# Patient Record
Sex: FEMALE | Race: WHITE | NOT HISPANIC OR LATINO | Employment: FULL TIME | URBAN - METROPOLITAN AREA
[De-identification: names, ages, dates, MRNs, and addresses within clinical notes are randomized per-mention and may not be internally consistent; named-entity substitution may affect disease eponyms.]

---

## 2019-08-05 ENCOUNTER — RECORDS - HEALTHEAST (OUTPATIENT)
Dept: LAB | Facility: CLINIC | Age: 49
End: 2019-08-05

## 2019-08-05 LAB
ANION GAP SERPL CALCULATED.3IONS-SCNC: 9 MMOL/L (ref 5–18)
BUN SERPL-MCNC: 9 MG/DL (ref 8–22)
CALCIUM SERPL-MCNC: 9.3 MG/DL (ref 8.5–10.5)
CHLORIDE BLD-SCNC: 100 MMOL/L (ref 98–107)
CHOLEST SERPL-MCNC: 203 MG/DL
CO2 SERPL-SCNC: 27 MMOL/L (ref 22–31)
CREAT SERPL-MCNC: 0.78 MG/DL (ref 0.6–1.1)
FASTING STATUS PATIENT QL REPORTED: ABNORMAL
FERRITIN SERPL-MCNC: 12 NG/ML (ref 10–130)
GFR SERPL CREATININE-BSD FRML MDRD: >60 ML/MIN/1.73M2
GLUCOSE BLD-MCNC: 79 MG/DL (ref 70–125)
HDLC SERPL-MCNC: 76 MG/DL
IRON SATN MFR SERPL: 7 % (ref 20–50)
IRON SERPL-MCNC: 34 UG/DL (ref 42–175)
LDLC SERPL CALC-MCNC: 100 MG/DL
POTASSIUM BLD-SCNC: 4 MMOL/L (ref 3.5–5)
SODIUM SERPL-SCNC: 136 MMOL/L (ref 136–145)
TIBC SERPL-MCNC: 465 UG/DL (ref 313–563)
TRANSFERRIN SERPL-MCNC: 372 MG/DL (ref 212–360)
TRIGL SERPL-MCNC: 135 MG/DL
TSH SERPL DL<=0.005 MIU/L-ACNC: 0.77 UIU/ML (ref 0.3–5)
VIT B12 SERPL-MCNC: 208 PG/ML (ref 213–816)

## 2019-08-06 LAB
HPV SOURCE: ABNORMAL
HUMAN PAPILLOMA VIRUS 16 DNA: NEGATIVE
HUMAN PAPILLOMA VIRUS 18 DNA: NEGATIVE
HUMAN PAPILLOMA VIRUS FINAL DIAGNOSIS: ABNORMAL
HUMAN PAPILLOMA VIRUS OTHER HR: POSITIVE
SPECIMEN DESCRIPTION: ABNORMAL

## 2019-08-12 LAB
BKR LAB AP ABNORMAL BLEEDING: NO
BKR LAB AP BIRTH CONTROL/HORMONES: ABNORMAL
BKR LAB AP CERVICAL APPEARANCE: NORMAL
BKR LAB AP GYN ADEQUACY: ABNORMAL
BKR LAB AP GYN INTERPRETATION: ABNORMAL
BKR LAB AP HPV REFLEX: ABNORMAL
BKR LAB AP LMP: ABNORMAL
BKR LAB AP PATIENT STATUS: NO
BKR LAB AP PREVIOUS ABNORMAL: ABNORMAL
BKR LAB AP PREVIOUS NORMAL: ABNORMAL
HIGH RISK?: NO
PATH REPORT.COMMENTS IMP SPEC: ABNORMAL
RESULT FLAG (HE HISTORICAL CONVERSION): ABNORMAL

## 2019-12-05 ENCOUNTER — RECORDS - HEALTHEAST (OUTPATIENT)
Dept: LAB | Facility: CLINIC | Age: 49
End: 2019-12-05

## 2019-12-05 LAB
ERYTHROCYTE [DISTWIDTH] IN BLOOD BY AUTOMATED COUNT: 14.6 % (ref 11–14.5)
FERRITIN SERPL-MCNC: 14 NG/ML (ref 10–130)
HCT VFR BLD AUTO: 41.4 % (ref 35–47)
HGB BLD-MCNC: 13.3 G/DL (ref 12–16)
IRON SATN MFR SERPL: 11 % (ref 20–50)
IRON SERPL-MCNC: 50 UG/DL (ref 42–175)
MCH RBC QN AUTO: 29.9 PG (ref 27–34)
MCHC RBC AUTO-ENTMCNC: 32.1 G/DL (ref 32–36)
MCV RBC AUTO: 93 FL (ref 80–100)
PLATELET # BLD AUTO: 370 THOU/UL (ref 140–440)
PMV BLD AUTO: 9.3 FL (ref 8.5–12.5)
RBC # BLD AUTO: 4.45 MILL/UL (ref 3.8–5.4)
TIBC SERPL-MCNC: 476 UG/DL (ref 313–563)
TRANSFERRIN SERPL-MCNC: 381 MG/DL (ref 212–360)
VIT B12 SERPL-MCNC: 264 PG/ML (ref 213–816)
WBC: 8.8 THOU/UL (ref 4–11)

## 2020-04-08 ENCOUNTER — ALLIED HEALTH/NURSE VISIT (OUTPATIENT)
Dept: PHARMACY | Facility: PHYSICIAN GROUP | Age: 50
End: 2020-04-08
Payer: COMMERCIAL

## 2020-04-08 DIAGNOSIS — F32.9 MAJOR DEPRESSIVE DISORDER, REMISSION STATUS UNSPECIFIED, UNSPECIFIED WHETHER RECURRENT: ICD-10-CM

## 2020-04-08 DIAGNOSIS — F41.9 ANXIETY: Primary | ICD-10-CM

## 2020-04-08 DIAGNOSIS — G47.00 INSOMNIA, UNSPECIFIED TYPE: ICD-10-CM

## 2020-04-08 DIAGNOSIS — Z98.84 HISTORY OF ROUX-EN-Y GASTRIC BYPASS: ICD-10-CM

## 2020-04-08 DIAGNOSIS — R51.9 RECURRENT HEADACHE: ICD-10-CM

## 2020-04-08 DIAGNOSIS — E53.8 VITAMIN B12 DEFICIENCY (NON ANEMIC): ICD-10-CM

## 2020-04-08 PROCEDURE — 99607 MTMS BY PHARM ADDL 15 MIN: CPT | Performed by: PHARMACIST

## 2020-04-08 PROCEDURE — 99605 MTMS BY PHARM NP 15 MIN: CPT | Performed by: PHARMACIST

## 2020-04-08 NOTE — PROGRESS NOTES
MTM Encounter  SUBJECTIVE/OBJECTIVE:                           Marta Woods is a 49 year old female called for an initial visit. She was referred to me from Paula Carroll PA-C. Patient consented to a telehealth visit: yes    Chief Complaint: Discuss medication options and pharmacogenomic testing for anxiety and depression.    Allergies/ADRs: Reviewed in eCW  Tobacco:  reports that she has been smoking cigarettes. She does not have any smokeless tobacco history on file.  Alcohol: more than 10 beverages / week  PMH: Reviewed in eCW, significant for depression, anxiety, insomnia, migraines, pain, and h/o Ravi-en-Y gastric bypass    Medication Adherence/Access: no issues reported    Anxiety, Depression, Insomnia:  Current medications:   Duloxetine 60 mg once daily- started on 3/27   Lorazepam 0.5 mg twice daily prn- used 2x this week  Trazodone 300 mg (3x 100 mg) once daily at bedtime for sleep    Patient reports increased anxiety and depression have worsened recently so her provider changed venlafaxine to duloxetine. She has to take 300 mg of trazodone for sleep but still doesn't think it works very well. It helps some but she still awakes during the night.  Therapies Tried and Response:   Venlafaxine XR  mg daily - worked for a long time then stopped recently stopped, did have a little bit of withdrawal on the 1st-2nd day  Fluoxetine- unsure  Hydroxyzine 25 mg- side effects  Gabapentin 300 mg    Patient's reasons for doing pharmacogenetic testing through [employer]: has required high doses of a lot of medications, recently made a medication change and provider thought this info would be helpful  Patient's expectations from test results: Unsure  Patient's concerns about test: cost, usefulness in her situation  Insurance information: Patient is the primary account [employer].      Headaches: Patient reports episodes of headaches that have been getting worse. They can keep her up at night at times.     History of  Ravi-en-Y Gastric Bypass: Current medications: Vitamin B-12 1000 mcg IM monthly  Patient did initially take supplements after her procedure but overtime has stopped these. The only supplement she has continued is B-12 injections. She does not still follow with bariatric surgeon.       Today's Vitals: There were no vitals taken for this visit.- telephone encounter    ASSESSMENT:                          Medication Adherence: good, no issues identified    1. Anxiety, Depression, Insomnia: Needs improvement. Patient's high dose requirements and poor response may be related to altered absorption post-RYGB. Duloxetine is a delayed-release formulation and is primarily absorbed in small intestine, patients who have undergone RYGB were found to have 42% lower total exposure. She may benefit from splitting dose to 30 mg twice daily. May also consider switching trazodone to amitriptyline to help with sleep and migraines. Trazodone taper would be needed given high dose (200 mg x1 week, 100 mg x1 week, 50 mg x1 week, stop). Appropriate to cross-taper starting amitriptyline at 25 mg x2 weeks, then increasing to 50 mg if needed. Given potential out of pocket cost for patient, she may benefit from trying these strategies prior to considering pharmacogenomic testing.   Pharmacogenomic education provided:   - Potential impact of pharmacokinetic and pharmacodynamic genetic variation on medication metabolism and action  - Reviewed genes and medications tested   - Reviewed what report will look like  - How information may be helpful in guiding treatment  - How results may be useful when reviewing other medications  - Limitations of test results on individual medication experience  Patient Consent Form reviewed with patient, patient provided opportunity to ask questions, confirmed understanding.    2. Recurrent headaches: Needs improvement. Patient may benefit from switching trazodone to amitriptyline given evidence for amitriptyline in  migraine prevention.     3. History of Ravi-en-Y gastric bypass: Stable.       PLAN:                            1. Continue current medicines for now    2. Read through the information I included about [employer] testing and medicines for anxiety and depression    3. PharmD will discuss the following recommendations with provider:   a. Consider dividing duloxetine dose to 30 mg twice daily (AM and Afternoon)  b. Consider switching trazodone to amitriptyline to help with sleep and migraines  c. Recommended dosing schedule:  i. Week 1: trazodone 200 mg, amitriptyline 25 mg at bedtime  ii. Week 2: trazodone 100 mg, amitriptyline 25 mg at bedtime  iii. Week 3: trazodone 50 mg, amitriptyline 50 mg at bedtime  iv. Week 4: amitriptyline 50 mg at bedtime      PharmD Follow-up: By phone in 1 week    I spent 60 minutes with this patient today. I offer these suggestions for consideration by Paula Carroll PA-C. A copy of the visit note was provided to the patient's primary care provider.    The patient was securely emailed a summary of these recommendations at her request.     Amanda Mccarthy, PharmD  Medication Therapy Management Pharmacist  [facility]  Pager: [phone]

## 2020-04-08 NOTE — PATIENT INSTRUCTIONS
Recommendations from today's MTM visit:                                                    MTM (medication therapy management) is a service provided by a clinical pharmacist designed to help you get the most of out of your medicines.   Today we reviewed what your medicines are for, how to know if they are working, that your medicines are safe and how to make your medicine regimen as easy as possible.     1. Continue taking your current medicines    2. Read through the information I included about GeneSight testing and medicines for anxiety and depression    3. I will talk with Grete about medications that may be better options for you to help with migraines, sleep, and anxiety    It was great to speak with you today.  I value your experience and would be very thankful for your time with providing feedback on our clinic survey. You may receive a survey via email or text message in the next few days.     Next MTM visit: By phone on Wednesday, 4/15/2020 at 2:00 pm    To schedule another MTM appointment, please call the clinic directly or you may call the MTM scheduling line at [phone] or toll-free at [phone].     My Clinical Pharmacist's contact information:                                                      It was a pleasure talking with you today!  Please feel free to contact me with any questions or concerns you have.      Amanda Mccarthy, PharmD  Medication Therapy Management Pharmacist  [facility]  Pager: [phone]

## 2020-04-14 RX ORDER — DULOXETIN HYDROCHLORIDE 60 MG/1
60 CAPSULE, DELAYED RELEASE ORAL DAILY
COMMUNITY

## 2020-04-14 RX ORDER — CETIRIZINE HYDROCHLORIDE 10 MG/1
30 TABLET ORAL DAILY
COMMUNITY

## 2020-04-14 RX ORDER — CYANOCOBALAMIN 1000 UG/ML
1 INJECTION, SOLUTION INTRAMUSCULAR; SUBCUTANEOUS
COMMUNITY

## 2020-04-14 RX ORDER — TRAZODONE HYDROCHLORIDE 100 MG/1
300 TABLET ORAL AT BEDTIME
COMMUNITY

## 2020-04-14 RX ORDER — BETAMETHASONE DIPROPIONATE 0.05 %
OINTMENT (GRAM) TOPICAL 2 TIMES DAILY
COMMUNITY

## 2020-04-14 RX ORDER — LORAZEPAM 0.5 MG/1
0.5 TABLET ORAL 2 TIMES DAILY PRN
COMMUNITY

## 2020-04-15 NOTE — PROGRESS NOTES
MTM Encounter  SUBJECTIVE/OBJECTIVE:                           Marta Woods is a 49 year old female called for a follow-up visit. She was referred to me from Paula Sheikh PA-C.  Today's visit is a follow-up MTM visit from 4/8/2020. Patient consented to a telehealth visit: yes    Chief Complaint: Follow-up to discuss recommendations for depression and anxiety medications.    Allergies/ADRs: Reviewed in eCW  Tobacco:  reports that she has been smoking cigarettes. She does not have any smokeless tobacco history on file.  Alcohol: more than 10 beverages / week  PMH: Reviewed in eCW, significant for depression, anxiety, insomnia, migraines, pain, and h/o Ravi-en-Y gastric bypass    Medication Adherence/Access: no issues reported    Anxiety, Depression, Insomnia:  Current medications:   Duloxetine 60 mg once daily- started on 3/27   Lorazepam 0.5 mg twice daily prn- used at least once a day this week  Trazodone 300 mg (3x 100 mg) once daily at bedtime for sleep    Discussed medications recommendations from last visit. She is agreeable to this plan.   Doesn't know if her lorazepam is doing what it should or not. Hard to tell right now since she is only working half day. She will take one in the morning when she starts work but doesn't feel like it lasts long or makes her feel less anxious when she is working. Patient reports increased anxiety and depression have worsened with returning to work. She has to take 300 mg of trazodone for sleep but still doesn't think it works very well. It helps some but she still awakes during the night. She has gone days without it before and hasn't noticed any withdrawal symptoms.     Therapies Tried and Response:   Venlafaxine XR  mg daily - worked for a long time then stopped recently stopped, did have a little bit of withdrawal on the 1st-2nd day  Fluoxetine- unsure  Hydroxyzine 25 mg- side effects  Gabapentin 300 mg    Headaches, Pain: Patient reports episodes of  "headaches that have been getting worse. They can keep her up at night at times. She also is having pains in her neck and shoulders. Describes pain feeling \"tense\" and stiff. She has an appt to discuss with this with provider tomorrow and wonders what would good medication options for her. Has tried gabapentin, baclofen, methocarbamol in the past without much relief.     Today's Vitals: There were no vitals taken for this visit.- telemedicine encounter    ASSESSMENT:                          Medication Adherence: good, no issues identified    1. Anxiety, Depression, Insomnia: Needs improvement. Patient would benefit from splitting duloxetine dose to twice daily. She may be able to taper off trazodone faster than previously recommended given she has not experienced withdrawal symptoms with missed doses in the past. Continue to recommend starting with low dose amitriptyline with close monitoring. A taper of 2 weeks would likely be sufficient. Patient may also benefit from switching lorazepam to clonazepam to provide longer duration of anxiety relief when she transitions to full-time work. Clonazepam is also available in an oral disintegrating formulation reducing the risk of altered absorption post-RYGB. Reviewed goal is to come off benzodiazepine once maintenance anxiety and depression medications are stable.     2. Recurrent headache, Tension pain: Needs improvement. Switching patient's medications for anxiety and insomnia may also provide her with some benefits for headaches and pain. Recommend avoiding NSAIDS post-RYGB. Could consider topical diclofenac applied to shoulders/neck given very low systemic absorption and risks. Topical lidocaine cream or patch may also be a good option to avoid absorption issues post-RYGB.     PLAN:                            1. Switch duloxetine 60 mg capsule once daily to duloxetine 30 mg capsule twice daily. New Rx pended to send to pharmacy.  2. Discuss switching trazodone to " amitriptyline with provider tomorrow  3. Consider short trial switching lorazapam 0.5 mg BID prn to clonazepam 0.25 mg ODT once daily prn until maintenance regimen is stable  4. Consider trial of topical diclofenac 1% gel or topical lidocaine 4% patch or cream for pain relief       PharmD Follow-up: By phone in 4-6 weeks     I spent 30 minutes with this patient today. I offer these suggestions for consideration by Paula Carroll PA-C. A copy of the visit note was provided to the patient's primary care provider.    The patient was sent via secure email a summary of these recommendations.     Amanda Mccarthy, PharmD  Medication Therapy Management Pharmacist  [facility]  Pager: [phone]

## 2020-04-16 ENCOUNTER — ALLIED HEALTH/NURSE VISIT (OUTPATIENT)
Dept: PHARMACY | Facility: PHYSICIAN GROUP | Age: 50
End: 2020-04-16
Payer: COMMERCIAL

## 2020-04-16 DIAGNOSIS — R51.9 RECURRENT HEADACHE: ICD-10-CM

## 2020-04-16 DIAGNOSIS — F32.9 MAJOR DEPRESSIVE DISORDER, REMISSION STATUS UNSPECIFIED, UNSPECIFIED WHETHER RECURRENT: ICD-10-CM

## 2020-04-16 DIAGNOSIS — G47.00 INSOMNIA, UNSPECIFIED TYPE: ICD-10-CM

## 2020-04-16 DIAGNOSIS — R52 PAIN: ICD-10-CM

## 2020-04-16 DIAGNOSIS — F41.9 ANXIETY: Primary | ICD-10-CM

## 2020-04-16 PROCEDURE — 99606 MTMS BY PHARM EST 15 MIN: CPT | Performed by: PHARMACIST

## 2020-04-16 PROCEDURE — 99607 MTMS BY PHARM ADDL 15 MIN: CPT | Performed by: PHARMACIST

## 2020-04-16 NOTE — PATIENT INSTRUCTIONS
"Recommendations from today's MTM visit:                                                    MTM (medication therapy management) is a service provided by a clinical pharmacist designed to help you get the most of out of your medicines. We reviewed what your medicines are for, how to know if they are working, that your medicines are safe and how to make your medicine regimen as easy as possible.     1. You would benefit from taking duloxetine (Cymbalta) 30 mg two times a day to maximize absorption.    2. Talk with your provider tomorrow about switching trazodone to a medicine called amitriptyline. Amitriptyline may help reduce your headaches and pain in additional to sleep and anxiety.    Recommended dose schedule:    Week 1: trazodone 150 mg and amitriptyline 25 mg about 1 hour before bed    Week 2: trazodone 50 mg and amitriptyline 25 mg about 1 hour before bed    Week 3: amitriptyline 25-50 mg about 1 hour before bed    3. Medications like lorazepam are not meant to be used long term for anxiety but they can be helpful while we are trying to get your preventative anxiety medications more stable. Talk with your provider trying to switch lorazepam to clonazepam for a couple weeks to help control your anxiety throughout the work day. Clonazepam comes as a dissolvable tablet that wouldn't be affected by changes from gastric bypass. Because the effects last longer you would only take this once a day if needed.      4. Duloxetine, amitriptyline and lorazepam or clonazepam can all help reduce your tension pain. Other options:    Diclofenac gel- this medicine is like a stronger version of ibuprofen that you apply directly to the painful areas. Since it doesn't go through your stomach it would be a safe \"NSAID\" option to use after gastric bypass    Lidocaine patch or cream- this may also be a good option    It was great to speak with you yesterday.  I value your experience and would be very thankful for your time with " providing feedback on our clinic survey. You may receive a survey via email or text message in the next few days.     Next MTM visit: By phone in 4 to 6 weeks to check-in on medication changes    To schedule another MTM appointment, please call the clinic directly or you may call the MTM scheduling line at [phone] or toll-free at [phone].     My Clinical Pharmacist's contact information:                                                      It was a pleasure talking with you yesterday!  Please feel free to contact me with any questions or concerns you have.      Amanda Mccarthy, PharmD  Medication Therapy Management Pharmacist  [facility]  Pager: [phone]

## 2020-04-17 ENCOUNTER — RECORDS - HEALTHEAST (OUTPATIENT)
Dept: LAB | Facility: CLINIC | Age: 50
End: 2020-04-17

## 2020-04-17 LAB
ALBUMIN SERPL-MCNC: 3.7 G/DL (ref 3.5–5)
ALP SERPL-CCNC: 138 U/L (ref 45–120)
ALT SERPL W P-5'-P-CCNC: 112 U/L (ref 0–45)
ANION GAP SERPL CALCULATED.3IONS-SCNC: 10 MMOL/L (ref 5–18)
AST SERPL W P-5'-P-CCNC: 88 U/L (ref 0–40)
BILIRUB SERPL-MCNC: 0.3 MG/DL (ref 0–1)
BUN SERPL-MCNC: 10 MG/DL (ref 8–22)
CALCIUM SERPL-MCNC: 8.5 MG/DL (ref 8.5–10.5)
CHLORIDE BLD-SCNC: 102 MMOL/L (ref 98–107)
CO2 SERPL-SCNC: 25 MMOL/L (ref 22–31)
CREAT SERPL-MCNC: 0.76 MG/DL (ref 0.6–1.1)
GFR SERPL CREATININE-BSD FRML MDRD: >60 ML/MIN/1.73M2
GLUCOSE BLD-MCNC: 73 MG/DL (ref 70–125)
LIPASE SERPL-CCNC: 29 U/L (ref 0–52)
POTASSIUM BLD-SCNC: 4.3 MMOL/L (ref 3.5–5)
PROT SERPL-MCNC: 6.9 G/DL (ref 6–8)
SODIUM SERPL-SCNC: 137 MMOL/L (ref 136–145)

## 2020-04-18 LAB — BACTERIA SPEC CULT: NO GROWTH

## 2020-07-31 ENCOUNTER — RECORDS - HEALTHEAST (OUTPATIENT)
Dept: LAB | Facility: CLINIC | Age: 50
End: 2020-07-31

## 2020-08-01 LAB — BACTERIA SPEC CULT: NO GROWTH

## 2020-08-04 ENCOUNTER — RECORDS - HEALTHEAST (OUTPATIENT)
Dept: LAB | Facility: CLINIC | Age: 50
End: 2020-08-04

## 2020-08-04 LAB
ALBUMIN SERPL-MCNC: 3.5 G/DL (ref 3.5–5)
ALP SERPL-CCNC: 127 U/L (ref 45–120)
ALT SERPL W P-5'-P-CCNC: <9 U/L (ref 0–45)
AST SERPL W P-5'-P-CCNC: 13 U/L (ref 0–40)
BILIRUB DIRECT SERPL-MCNC: 0.1 MG/DL
BILIRUB SERPL-MCNC: 0.3 MG/DL (ref 0–1)
PROT SERPL-MCNC: 6.9 G/DL (ref 6–8)

## 2020-08-05 LAB
HAV IGM SERPL QL IA: NEGATIVE
HBV CORE IGM SERPL QL IA: NEGATIVE
HBV SURFACE AG SERPL QL IA: NEGATIVE
HCV AB SERPL QL IA: NEGATIVE
HCV AB SERPL QL IA: NEGATIVE

## 2020-11-19 ENCOUNTER — RECORDS - HEALTHEAST (OUTPATIENT)
Dept: LAB | Facility: CLINIC | Age: 50
End: 2020-11-19

## 2020-11-21 LAB — BACTERIA SPEC CULT: NORMAL

## 2020-12-24 ENCOUNTER — RECORDS - HEALTHEAST (OUTPATIENT)
Dept: LAB | Facility: CLINIC | Age: 50
End: 2020-12-24

## 2020-12-24 LAB
SARS-COV-2 PCR COMMENT: NORMAL
SARS-COV-2 RNA SPEC QL NAA+PROBE: NEGATIVE
SARS-COV-2 VIRUS SPECIMEN SOURCE: NORMAL

## 2021-02-01 ENCOUNTER — RECORDS - HEALTHEAST (OUTPATIENT)
Dept: LAB | Facility: CLINIC | Age: 51
End: 2021-02-01

## 2021-02-02 LAB
ALBUMIN SERPL-MCNC: 4.1 G/DL (ref 3.5–5)
ALBUMIN UR-MCNC: NEGATIVE MG/DL
ALP SERPL-CCNC: 116 U/L (ref 45–120)
ALT SERPL W P-5'-P-CCNC: <9 U/L (ref 0–45)
ANION GAP SERPL CALCULATED.3IONS-SCNC: 12 MMOL/L (ref 5–18)
APPEARANCE UR: CLEAR
AST SERPL W P-5'-P-CCNC: 13 U/L (ref 0–40)
BACTERIA #/AREA URNS HPF: ABNORMAL HPF
BILIRUB SERPL-MCNC: 0.3 MG/DL (ref 0–1)
BILIRUB UR QL STRIP: NEGATIVE
BUN SERPL-MCNC: 11 MG/DL (ref 8–22)
CALCIUM SERPL-MCNC: 8.6 MG/DL (ref 8.5–10.5)
CHLORIDE BLD-SCNC: 101 MMOL/L (ref 98–107)
CHOLEST SERPL-MCNC: 207 MG/DL
CO2 SERPL-SCNC: 24 MMOL/L (ref 22–31)
COLOR UR AUTO: YELLOW
CREAT SERPL-MCNC: 0.82 MG/DL (ref 0.6–1.1)
FASTING STATUS PATIENT QL REPORTED: ABNORMAL
FERRITIN SERPL-MCNC: 7 NG/ML (ref 10–130)
GFR SERPL CREATININE-BSD FRML MDRD: >60 ML/MIN/1.73M2
GLUCOSE BLD-MCNC: 85 MG/DL (ref 70–125)
GLUCOSE UR STRIP-MCNC: NEGATIVE MG/DL
HDLC SERPL-MCNC: 65 MG/DL
HGB UR QL STRIP: ABNORMAL
IRON SATN MFR SERPL: 5 % (ref 20–50)
IRON SERPL-MCNC: 23 UG/DL (ref 42–175)
KETONES UR STRIP-MCNC: NEGATIVE MG/DL
LDLC SERPL CALC-MCNC: 111 MG/DL
LEUKOCYTE ESTERASE UR QL STRIP: NEGATIVE
MUCOUS THREADS #/AREA URNS LPF: ABNORMAL LPF
NITRATE UR QL: NEGATIVE
PH UR STRIP: 6 [PH] (ref 4.5–8)
POTASSIUM BLD-SCNC: 4.1 MMOL/L (ref 3.5–5)
PROT SERPL-MCNC: 7.1 G/DL (ref 6–8)
RBC #/AREA URNS AUTO: ABNORMAL HPF
SODIUM SERPL-SCNC: 137 MMOL/L (ref 136–145)
SP GR UR STRIP: 1.01 (ref 1–1.03)
SQUAMOUS #/AREA URNS AUTO: ABNORMAL LPF
TIBC SERPL-MCNC: 500 UG/DL (ref 313–563)
TRANSFERRIN SERPL-MCNC: 400 MG/DL (ref 212–360)
TRIGL SERPL-MCNC: 153 MG/DL
TSH SERPL DL<=0.005 MIU/L-ACNC: 1.17 UIU/ML (ref 0.3–5)
UROBILINOGEN UR STRIP-ACNC: ABNORMAL
VIT B12 SERPL-MCNC: >2000 PG/ML (ref 213–816)
WBC #/AREA URNS AUTO: ABNORMAL HPF

## 2021-02-04 LAB
BKR LAB AP ABNORMAL BLEEDING: NO
BKR LAB AP BIRTH CONTROL/HORMONES: ABNORMAL
BKR LAB AP CERVICAL APPEARANCE: ABNORMAL
BKR LAB AP GYN ADEQUACY: ABNORMAL
BKR LAB AP GYN INTERPRETATION: ABNORMAL
BKR LAB AP HPV REFLEX: ABNORMAL
BKR LAB AP LMP: ABNORMAL
BKR LAB AP PATIENT STATUS: ABNORMAL
BKR LAB AP PREVIOUS ABNORMAL: ABNORMAL
BKR LAB AP PREVIOUS NORMAL: ABNORMAL
HIGH RISK?: YES
INTERPRETING LAB: ABNORMAL
PATH REPORT.COMMENTS IMP SPEC: ABNORMAL
RESULT FLAG (HE HISTORICAL CONVERSION): ABNORMAL

## 2021-02-10 LAB
HPV SOURCE: ABNORMAL
HUMAN PAPILLOMA VIRUS 16 DNA: POSITIVE
HUMAN PAPILLOMA VIRUS 18 DNA: NEGATIVE
HUMAN PAPILLOMA VIRUS FINAL DIAGNOSIS: ABNORMAL
HUMAN PAPILLOMA VIRUS OTHER HR: POSITIVE
SPECIMEN DESCRIPTION: ABNORMAL

## 2021-03-03 ENCOUNTER — RECORDS - HEALTHEAST (OUTPATIENT)
Dept: ADMINISTRATIVE | Facility: OTHER | Age: 51
End: 2021-03-03

## 2021-03-03 LAB
LAB AP CHARGES (HE HISTORICAL CONVERSION): NORMAL
PATH REPORT.COMMENTS IMP SPEC: NORMAL
PATH REPORT.COMMENTS IMP SPEC: NORMAL
PATH REPORT.FINAL DX SPEC: NORMAL
PATH REPORT.GROSS SPEC: NORMAL
PATH REPORT.MICROSCOPIC SPEC OTHER STN: NORMAL
PATH REPORT.RELEVANT HX SPEC: NORMAL
RESULT FLAG (HE HISTORICAL CONVERSION): NORMAL

## 2021-05-27 ENCOUNTER — RECORDS - HEALTHEAST (OUTPATIENT)
Dept: ADMINISTRATIVE | Facility: CLINIC | Age: 51
End: 2021-05-27

## 2021-05-30 ENCOUNTER — RECORDS - HEALTHEAST (OUTPATIENT)
Dept: ADMINISTRATIVE | Facility: CLINIC | Age: 51
End: 2021-05-30

## 2021-10-25 ENCOUNTER — LAB REQUISITION (OUTPATIENT)
Dept: LAB | Facility: CLINIC | Age: 51
End: 2021-10-25

## 2021-10-25 DIAGNOSIS — M79.604 PAIN IN RIGHT LEG: ICD-10-CM

## 2021-10-25 PROCEDURE — 84466 ASSAY OF TRANSFERRIN: CPT | Performed by: PHYSICIAN ASSISTANT

## 2021-10-25 PROCEDURE — 84155 ASSAY OF PROTEIN SERUM: CPT | Performed by: PHYSICIAN ASSISTANT

## 2021-10-25 PROCEDURE — 82550 ASSAY OF CK (CPK): CPT | Performed by: PHYSICIAN ASSISTANT

## 2021-10-25 PROCEDURE — 82607 VITAMIN B-12: CPT | Performed by: PHYSICIAN ASSISTANT

## 2021-10-25 PROCEDURE — 82728 ASSAY OF FERRITIN: CPT | Performed by: PHYSICIAN ASSISTANT

## 2021-10-26 LAB
ALBUMIN SERPL-MCNC: 3.6 G/DL (ref 3.5–5)
ALP SERPL-CCNC: 119 U/L (ref 45–120)
ALT SERPL W P-5'-P-CCNC: 11 U/L (ref 0–45)
ANION GAP SERPL CALCULATED.3IONS-SCNC: 11 MMOL/L (ref 5–18)
AST SERPL W P-5'-P-CCNC: 16 U/L (ref 0–40)
BILIRUB SERPL-MCNC: 0.2 MG/DL (ref 0–1)
BUN SERPL-MCNC: 11 MG/DL (ref 8–22)
CALCIUM SERPL-MCNC: 8.6 MG/DL (ref 8.5–10.5)
CHLORIDE BLD-SCNC: 105 MMOL/L (ref 98–107)
CK SERPL-CCNC: 103 U/L (ref 30–190)
CO2 SERPL-SCNC: 23 MMOL/L (ref 22–31)
CREAT SERPL-MCNC: 0.83 MG/DL (ref 0.6–1.1)
FERRITIN SERPL-MCNC: 7 NG/ML (ref 10–130)
GFR SERPL CREATININE-BSD FRML MDRD: 82 ML/MIN/1.73M2
GLUCOSE BLD-MCNC: 79 MG/DL (ref 70–125)
IRON SATN MFR SERPL: 5 % (ref 20–50)
IRON SERPL-MCNC: 22 UG/DL (ref 42–175)
POTASSIUM BLD-SCNC: 4.3 MMOL/L (ref 3.5–5)
PROT SERPL-MCNC: 6.6 G/DL (ref 6–8)
SODIUM SERPL-SCNC: 139 MMOL/L (ref 136–145)
TIBC SERPL-MCNC: 459 UG/DL (ref 313–563)
TRANSFERRIN SERPL-MCNC: 367 MG/DL (ref 212–360)
VIT B12 SERPL-MCNC: >2000 PG/ML (ref 213–816)

## 2021-12-08 ENCOUNTER — LAB REQUISITION (OUTPATIENT)
Dept: LAB | Facility: CLINIC | Age: 51
End: 2021-12-08

## 2021-12-08 DIAGNOSIS — N76.0 ACUTE VAGINITIS: ICD-10-CM

## 2021-12-08 LAB
CLUE CELLS: ABNORMAL
NUGENT SCORE: 9
WHITE BLOOD CELLS: ABNORMAL

## 2021-12-08 PROCEDURE — 87205 SMEAR GRAM STAIN: CPT | Performed by: PHYSICIAN ASSISTANT

## 2021-12-08 PROCEDURE — 87102 FUNGUS ISOLATION CULTURE: CPT | Performed by: PHYSICIAN ASSISTANT

## 2021-12-13 LAB — BACTERIA SPEC CULT: NO GROWTH
